# Patient Record
Sex: FEMALE | URBAN - NONMETROPOLITAN AREA
[De-identification: names, ages, dates, MRNs, and addresses within clinical notes are randomized per-mention and may not be internally consistent; named-entity substitution may affect disease eponyms.]

---

## 2021-05-27 ENCOUNTER — OFFICE VISIT (OUTPATIENT)
Dept: URBAN - NONMETROPOLITAN AREA CLINIC 1 | Facility: CLINIC | Age: 81
End: 2021-05-27
Payer: COMMERCIAL

## 2021-05-27 DIAGNOSIS — H52.13 MYOPIA, BILATERAL: Primary | ICD-10-CM

## 2021-05-27 PROCEDURE — 92082 INTERMEDIATE VISUAL FIELD XM: CPT | Performed by: OPTOMETRIST

## 2021-05-27 PROCEDURE — 92004 COMPRE OPH EXAM NEW PT 1/>: CPT | Performed by: OPTOMETRIST

## 2021-05-27 ASSESSMENT — VISUAL ACUITY
OD: 20/40
OS: CF

## 2021-05-27 ASSESSMENT — INTRAOCULAR PRESSURE
OS: 20
OD: 17

## 2022-03-18 ENCOUNTER — OFFICE VISIT (OUTPATIENT)
Dept: URBAN - NONMETROPOLITAN AREA CLINIC 1 | Facility: CLINIC | Age: 82
End: 2022-03-18
Payer: COMMERCIAL

## 2022-03-18 DIAGNOSIS — H31.092 OTHER CHORIORETINAL SCARS, LEFT EYE: Primary | ICD-10-CM

## 2022-03-18 DIAGNOSIS — H52.4 PRESBYOPIA: ICD-10-CM

## 2022-03-18 DIAGNOSIS — H35.40 PERIPHERAL RETINAL DEGENERATION: ICD-10-CM

## 2022-03-18 PROCEDURE — 99213 OFFICE O/P EST LOW 20 MIN: CPT | Performed by: OPTOMETRIST

## 2022-03-18 PROCEDURE — 92082 INTERMEDIATE VISUAL FIELD XM: CPT | Performed by: OPTOMETRIST

## 2022-03-18 ASSESSMENT — KERATOMETRY
OD: 47.25
OS: 46.75

## 2022-03-18 ASSESSMENT — INTRAOCULAR PRESSURE
OS: 17
OD: 16

## 2022-03-18 ASSESSMENT — VISUAL ACUITY: OD: 20/30

## 2022-03-18 NOTE — IMPRESSION/PLAN
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 Will continue to monitor.

## 2022-03-18 NOTE — IMPRESSION/PLAN
Impression: Peripheral retinal degeneration: H35.40. Plan: Educated pt on exam findings. Will continue to monitor.